# Patient Record
Sex: MALE | Race: WHITE | NOT HISPANIC OR LATINO | Employment: FULL TIME | URBAN - METROPOLITAN AREA
[De-identification: names, ages, dates, MRNs, and addresses within clinical notes are randomized per-mention and may not be internally consistent; named-entity substitution may affect disease eponyms.]

---

## 2017-01-03 ENCOUNTER — APPOINTMENT (OUTPATIENT)
Dept: PREADMISSION TESTING | Facility: HOSPITAL | Age: 62
End: 2017-01-03

## 2017-01-03 ENCOUNTER — HOSPITAL ENCOUNTER (OUTPATIENT)
Dept: GENERAL RADIOLOGY | Facility: HOSPITAL | Age: 62
Discharge: HOME | End: 2017-01-03
Admitting: ORTHOPAEDIC SURGERY

## 2017-01-03 VITALS — WEIGHT: 209.44 LBS | BODY MASS INDEX: 33.66 KG/M2 | HEIGHT: 66 IN

## 2017-01-03 DIAGNOSIS — Z01.89 LABORATORY TEST: Primary | ICD-10-CM

## 2017-01-03 LAB
ABO GROUP BLD: NORMAL
ALBUMIN SERPL-MCNC: 4.2 G/DL (ref 3.2–4.8)
ALBUMIN/GLOB SERPL: 1.8 G/DL (ref 1.5–2.5)
ALP SERPL-CCNC: 82 U/L (ref 25–100)
ALT SERPL W P-5'-P-CCNC: 20 U/L (ref 7–40)
ANION GAP SERPL CALCULATED.3IONS-SCNC: 1 MMOL/L (ref 3–11)
APTT PPP: 25.5 SECONDS (ref 24–31)
AST SERPL-CCNC: 18 U/L (ref 0–33)
BILIRUB SERPL-MCNC: 0.7 MG/DL (ref 0.3–1.2)
BUN BLD-MCNC: 13 MG/DL (ref 9–23)
BUN/CREAT SERPL: 14.4 (ref 7–25)
CALCIUM SPEC-SCNC: 9.2 MG/DL (ref 8.7–10.4)
CHLORIDE SERPL-SCNC: 107 MMOL/L (ref 99–109)
CO2 SERPL-SCNC: 32 MMOL/L (ref 20–31)
CREAT BLD-MCNC: 0.9 MG/DL (ref 0.6–1.3)
DEPRECATED RDW RBC AUTO: 49.1 FL (ref 37–54)
ERYTHROCYTE [DISTWIDTH] IN BLOOD BY AUTOMATED COUNT: 14.5 % (ref 11.3–14.5)
GFR SERPL CREATININE-BSD FRML MDRD: 86 ML/MIN/1.73
GLOBULIN UR ELPH-MCNC: 2.3 GM/DL
GLUCOSE BLD-MCNC: 120 MG/DL (ref 70–100)
HBA1C MFR BLD: 5.8 % (ref 4.8–5.6)
HCT VFR BLD AUTO: 45.3 % (ref 38.9–50.9)
HGB BLD-MCNC: 15.9 G/DL (ref 13.1–17.5)
INR PPP: 0.95
MCH RBC QN AUTO: 32.5 PG (ref 27–31)
MCHC RBC AUTO-ENTMCNC: 35.1 G/DL (ref 32–36)
MCV RBC AUTO: 92.6 FL (ref 80–99)
PLATELET # BLD AUTO: 143 10*3/MM3 (ref 150–450)
PMV BLD AUTO: 11.2 FL (ref 6–12)
POTASSIUM BLD-SCNC: 4.2 MMOL/L (ref 3.5–5.5)
PROT SERPL-MCNC: 6.5 G/DL (ref 5.7–8.2)
PROTHROMBIN TIME: 10.3 SECONDS (ref 9.6–11.5)
RBC # BLD AUTO: 4.89 10*6/MM3 (ref 4.2–5.76)
RH BLD: POSITIVE
SODIUM BLD-SCNC: 140 MMOL/L (ref 132–146)
WBC NRBC COR # BLD: 6.84 10*3/MM3 (ref 3.5–10.8)

## 2017-01-03 PROCEDURE — 85027 COMPLETE CBC AUTOMATED: CPT | Performed by: ORTHOPAEDIC SURGERY

## 2017-01-03 PROCEDURE — 83036 HEMOGLOBIN GLYCOSYLATED A1C: CPT | Performed by: ORTHOPAEDIC SURGERY

## 2017-01-03 PROCEDURE — 93005 ELECTROCARDIOGRAM TRACING: CPT

## 2017-01-03 PROCEDURE — 86901 BLOOD TYPING SEROLOGIC RH(D): CPT

## 2017-01-03 PROCEDURE — 85610 PROTHROMBIN TIME: CPT | Performed by: ORTHOPAEDIC SURGERY

## 2017-01-03 PROCEDURE — 71020 HC CHEST PA AND LATERAL: CPT

## 2017-01-03 PROCEDURE — 80053 COMPREHEN METABOLIC PANEL: CPT | Performed by: ORTHOPAEDIC SURGERY

## 2017-01-03 PROCEDURE — 86900 BLOOD TYPING SEROLOGIC ABO: CPT

## 2017-01-03 PROCEDURE — 85730 THROMBOPLASTIN TIME PARTIAL: CPT | Performed by: ORTHOPAEDIC SURGERY

## 2017-01-03 PROCEDURE — 36415 COLL VENOUS BLD VENIPUNCTURE: CPT

## 2017-01-03 RX ORDER — LISINOPRIL 10 MG/1
10 TABLET ORAL DAILY
COMMUNITY
Start: 2013-09-17

## 2017-01-03 NOTE — DISCHARGE INSTRUCTIONS
The following information and instructions were given:    NPO after MN except sips of water with routine prescribed medication (except blood thinner, diabetes, or weight reducing medication) unless otherwise instructed by your physician.  Do not eat, drink, smoke or chew gum after MN the night before surgery. This also includes no mints.    DO NOT shave, wear makeup or dark nail polish.    Remove all jewelry (advised to go to jeweler if unable to remove).    Leave anything you consider valuable at home.    Leave your suitcase in the car until after your surgery.    Bring the following with you (if applicable)   -picture ID and insurance cards   -Co-pay/deductible required by insurance   -Medications in the original bottles (not a list) including all over-the-counter  medications if not brought to PAT   -Copy of advance directive, living will or power of  documents if not  brought to PAT   -CPAP or BIPAP mask and tubing (do not bring machine)   -Skin prep instructions sheet   -PAT Pass   Education booklet, brochure, handout or binder given to patient.    Pain Control After Surgery handout given to patient.    Respirex use (handout given to patient) and pneumonia prevention.    Signs and Symptoms of infection.    DVT Prevention stressing the importance of ambulation.    Patient to apply Chlorhexadine wipes to surgical area (as instructed) the night before procedure and the AM of procedure.

## 2017-01-03 NOTE — PAT
BRIGETTE MEASUREMENTS   LENGTH 17IN   CALF 15.5IN    TYPE AND SCREEN TOO EARLY TO DRAW IN PAT, PINK TUBE DRAWN AND SENT TO LAB

## 2017-01-16 ENCOUNTER — ANESTHESIA EVENT (OUTPATIENT)
Dept: PERIOP | Facility: HOSPITAL | Age: 62
End: 2017-01-16

## 2017-01-16 ENCOUNTER — APPOINTMENT (OUTPATIENT)
Dept: GENERAL RADIOLOGY | Facility: HOSPITAL | Age: 62
End: 2017-01-16

## 2017-01-16 ENCOUNTER — ANESTHESIA (OUTPATIENT)
Dept: PERIOP | Facility: HOSPITAL | Age: 62
End: 2017-01-16

## 2017-01-16 PROBLEM — M19.019 SHOULDER ARTHRITIS: Status: ACTIVE | Noted: 2017-01-16

## 2017-01-16 PROBLEM — K21.9 GERD (GASTROESOPHAGEAL REFLUX DISEASE): Status: ACTIVE | Noted: 2017-01-16

## 2017-01-16 PROBLEM — Z96.612 STATUS POST TOTAL REPLACEMENT OF LEFT SHOULDER: Status: ACTIVE | Noted: 2017-01-16

## 2017-01-16 PROBLEM — I10 HTN (HYPERTENSION): Status: ACTIVE | Noted: 2017-01-16

## 2017-01-16 PROBLEM — F41.9 ANXIETY: Status: ACTIVE | Noted: 2017-01-16

## 2017-01-16 PROCEDURE — 25010000003 CEFAZOLIN IN DEXTROSE 2-4 GM/100ML-% SOLUTION: Performed by: ORTHOPAEDIC SURGERY

## 2017-01-16 PROCEDURE — 73020 X-RAY EXAM OF SHOULDER: CPT

## 2017-01-16 PROCEDURE — 25010000002 PROPOFOL 10 MG/ML EMULSION: Performed by: NURSE ANESTHETIST, CERTIFIED REGISTERED

## 2017-01-16 PROCEDURE — 25010000002 NEOSTIGMINE 10 MG/10ML SOLUTION: Performed by: NURSE ANESTHETIST, CERTIFIED REGISTERED

## 2017-01-16 PROCEDURE — 25010000002 HYDROMORPHONE PER 4 MG: Performed by: NURSE ANESTHETIST, CERTIFIED REGISTERED

## 2017-01-16 RX ORDER — HYDROMORPHONE HCL 110MG/55ML
PATIENT CONTROLLED ANALGESIA SYRINGE INTRAVENOUS AS NEEDED
Status: DISCONTINUED | OUTPATIENT
Start: 2017-01-16 | End: 2017-01-16 | Stop reason: SURG

## 2017-01-16 RX ORDER — ATRACURIUM BESYLATE 10 MG/ML
INJECTION, SOLUTION INTRAVENOUS AS NEEDED
Status: DISCONTINUED | OUTPATIENT
Start: 2017-01-16 | End: 2017-01-16 | Stop reason: SURG

## 2017-01-16 RX ORDER — PROPOFOL 10 MG/ML
VIAL (ML) INTRAVENOUS AS NEEDED
Status: DISCONTINUED | OUTPATIENT
Start: 2017-01-16 | End: 2017-01-16 | Stop reason: SURG

## 2017-01-16 RX ORDER — LIDOCAINE HYDROCHLORIDE 10 MG/ML
INJECTION, SOLUTION EPIDURAL; INFILTRATION; INTRACAUDAL; PERINEURAL AS NEEDED
Status: DISCONTINUED | OUTPATIENT
Start: 2017-01-16 | End: 2017-01-16 | Stop reason: SURG

## 2017-01-16 RX ORDER — NEOSTIGMINE METHYLSULFATE 1 MG/ML
INJECTION, SOLUTION INTRAVENOUS AS NEEDED
Status: DISCONTINUED | OUTPATIENT
Start: 2017-01-16 | End: 2017-01-16 | Stop reason: SURG

## 2017-01-16 RX ORDER — GLYCOPYRROLATE 0.2 MG/ML
INJECTION INTRAMUSCULAR; INTRAVENOUS AS NEEDED
Status: DISCONTINUED | OUTPATIENT
Start: 2017-01-16 | End: 2017-01-16 | Stop reason: SURG

## 2017-01-16 RX ORDER — BUPIVACAINE HYDROCHLORIDE 5 MG/ML
INJECTION, SOLUTION EPIDURAL; INTRACAUDAL AS NEEDED
Status: DISCONTINUED | OUTPATIENT
Start: 2017-01-16 | End: 2017-01-16 | Stop reason: SURG

## 2017-01-16 RX ORDER — BUPIVACAINE HYDROCHLORIDE 2.5 MG/ML
INJECTION, SOLUTION EPIDURAL; INFILTRATION; INTRACAUDAL AS NEEDED
Status: DISCONTINUED | OUTPATIENT
Start: 2017-01-16 | End: 2017-01-16 | Stop reason: SURG

## 2017-01-16 RX ADMIN — BUPIVACAINE HYDROCHLORIDE 15 ML: 2.5 INJECTION, SOLUTION EPIDURAL; INFILTRATION; INTRACAUDAL; PERINEURAL at 13:55

## 2017-01-16 RX ADMIN — CEFAZOLIN SODIUM 2 G: 2 INJECTION, SOLUTION INTRAVENOUS at 14:49

## 2017-01-16 RX ADMIN — LIDOCAINE HYDROCHLORIDE 50 MG: 10 INJECTION, SOLUTION EPIDURAL; INFILTRATION; INTRACAUDAL; PERINEURAL at 14:55

## 2017-01-16 RX ADMIN — HYDROMORPHONE HYDROCHLORIDE 0.5 MG: 2 INJECTION, SOLUTION INTRAMUSCULAR; INTRAVENOUS; SUBCUTANEOUS at 16:40

## 2017-01-16 RX ADMIN — PROPOFOL 150 MG: 10 INJECTION, EMULSION INTRAVENOUS at 14:55

## 2017-01-16 RX ADMIN — ROBINUL 0.4 MG: 0.2 INJECTION INTRAMUSCULAR; INTRAVENOUS at 16:35

## 2017-01-16 RX ADMIN — ATRACURIUM BESYLATE 50 MG: 10 INJECTION, SOLUTION INTRAVENOUS at 14:55

## 2017-01-16 RX ADMIN — NEOSTIGMINE METHYLSULFATE 2.5 MG: 1 INJECTION, SOLUTION INTRAVENOUS at 16:35

## 2017-01-16 RX ADMIN — BUPIVACAINE HYDROCHLORIDE 10 ML: 5 INJECTION, SOLUTION EPIDURAL; INTRACAUDAL; PERINEURAL at 16:41

## 2017-01-16 RX ADMIN — ROBINUL 0.2 MG: 0.2 INJECTION INTRAMUSCULAR; INTRAVENOUS at 16:26

## 2017-01-16 NOTE — ANESTHESIA PROCEDURE NOTES
Peripheral Block    Patient location during procedure: pre-op  Reason for block: at surgeon's request and post-op pain management  Performed by  Anesthesiologist: BETH DUQUE  CRNA: BAM SIFUENTES  Preanesthetic Checklist  Completed: patient identified, site marked, surgical consent, pre-op evaluation, timeout performed, IV checked, risks and benefits discussed and monitors and equipment checked  Peripheral Block Prep:  Sterile barriers:cap, gloves, mask and sterile barriers  Prep: ChloraPrep  Patient monitoring: blood pressure monitoring, continuous pulse oximetry and EKG  Peripheral Procedure  Sedation:yes  Guidance:ultrasound guided  Images:still images obtained  Laterality:leftBlock Type:interscalene  Injection Technique:catheterNeedle Type:Tuohy  Needle Gauge:18 G  Catheter Size:20 G (20g)  Medications  Local Injected:bupivacaine 0.25% without epinephrine Local Amount Injected:15mL  Post Assessment  Patient Tolerance:comfortable throughout block  Complications:no  Additional Notes  Procedure:                Catheter at skin-8                                      Anesthesia was provide by IV Sedation( see meds)      The pt was placed in semifowlers position with a slight tilt of the thorax contralateral to the insertion site.  The Insertion Site was prepped and draped in sterile fashion.  The skin was anesthetized with Lidocaine 1% 1ml injection utilizing a 25g needle.  Utilizing ultrasound guidance, a BBraun 2 inch 18 g Contiplex echogenic touhy needle was advanced in-plane.  Hydro dissection of tissue was achieved with Normal saline. Major vessels(carotid and Internal Jugular) where visualized as the brachial plexus was approached at the approximate level of C-7/ T-1.  Cervical 5 and Branches of Cervical 6 nerve roots where visualized and the needle tip was placed posterior at the level of C-6 roots.  LA spread was visualized and injection was made incrementally every 5 mls with aspiration. Injection  pressure was normal or little, there was no intraneural injection, no vascular injection.      The BBraun 20 g wire stylet  catheter was then placed under US guidance on the posterior aspect of the Brachial Plexus.  Location of catheter was confirmed with NS injection visualized with US . The tuohy was then removed and the skin was sealed with Skin AFix at catheter insertion site.  Skin was prepped with mastisol and the labeled catheter  was secured with steristrips and a CHG tegaderm. Thank You.

## 2017-01-16 NOTE — ANESTHESIA PROCEDURE NOTES
Airway  Urgency: elective    Airway not difficult    General Information and Staff    Patient location during procedure: OR    Indications and Patient Condition  Indications for airway management: airway protection    Preoxygenated: yes  MILS not maintained throughout  Mask difficulty assessment: 1 - vent by mask    Final Airway Details  Final airway type: endotracheal airway      Successful airway: ETT  Cuffed: yes   Successful intubation technique: direct laryngoscopy  Endotracheal tube insertion site: oral  Blade: Rivera  Blade size: #2  ETT size: 7.5 mm  Cormack-Lehane Classification: grade I - full view of glottis  Placement verified by: chest auscultation and capnometry   Measured from: lips  ETT to lips (cm): 20  Number of attempts at approach: 1    Additional Comments  Negative epigastric sounds, Breath sound equal bilaterally with symmetric chest rise and fall

## 2017-01-16 NOTE — ANESTHESIA POSTPROCEDURE EVALUATION
Patient: Delvin Meng    Procedure Summary     Date Anesthesia Start Anesthesia Stop Room / Location    01/16/17 1449 1652 [facility] / [facility]       Procedure Diagnosis Surgeon Provider    LEFT TOTAL SHOULDER ARTHROPLASTY (Left Shoulder) No diagnosis on file. MD Pee Nunez MD          Anesthesia Type: general  Last vitals  /76 (01/16/17 1648)    Temp 98 °F (36.7 °C) (01/16/17 1648)    Pulse 64 (01/16/17 1648)   Resp 16 (01/16/17 1648)    SpO2 98 % (01/16/17 1648)      Post Anesthesia Care and Evaluation    Patient location during evaluation: PACU  Patient participation: complete - patient participated  Level of consciousness: awake and alert  Pain score: 3  Pain management: adequate  Airway patency: patent  Anesthetic complications: No anesthetic complications  PONV Status: none  Cardiovascular status: hemodynamically stable and acceptable  Respiratory status: nonlabored ventilation, acceptable and nasal cannula  Hydration status: acceptable    Comments:

## 2017-01-16 NOTE — ANESTHESIA PREPROCEDURE EVALUATION
Anesthesia Evaluation     Patient summary reviewed and Nursing notes reviewed    History of anesthetic complications (ponv)   Airway   Mallampati: II  TM distance: >3 FB  Neck ROM: full  no difficulty expected  Dental      Pulmonary - normal exam   (+) hx of smoking,   Cardiovascular - normal exam  (+) hypertension,     Neuro/Psych  (+) psychiatric history Anxiety,    GI/Hepatic/Renal/Endo    (+)  hiatal hernia, GERD,     Musculoskeletal     Abdominal    Substance History      OB/GYN          Other   (+) arthritis                        Anesthesia Plan    ASA 2     general and regional   (Interscalene)  intravenous induction   Anesthetic plan and risks discussed with patient.    Plan discussed with CRNA.

## 2017-02-06 ENCOUNTER — TREATMENT (OUTPATIENT)
Dept: PHYSICAL THERAPY | Facility: CLINIC | Age: 62
End: 2017-02-06

## 2017-02-06 ENCOUNTER — TRANSCRIBE ORDERS (OUTPATIENT)
Dept: PHYSICAL THERAPY | Facility: CLINIC | Age: 62
End: 2017-02-06

## 2017-02-06 DIAGNOSIS — Z96.612 STATUS POST TOTAL SHOULDER REPLACEMENT, LEFT: ICD-10-CM

## 2017-02-06 DIAGNOSIS — M25.512 ACUTE PAIN OF LEFT SHOULDER: Primary | ICD-10-CM

## 2017-02-06 DIAGNOSIS — Z48.89 ENCOUNTER FOR OTHER SPECIFIED SURGICAL AFTERCARE: Primary | ICD-10-CM

## 2017-02-06 DIAGNOSIS — Z47.89 ORTHOPEDIC AFTERCARE: ICD-10-CM

## 2017-02-06 DIAGNOSIS — M25.512 LEFT SHOULDER PAIN, UNSPECIFIED CHRONICITY: ICD-10-CM

## 2017-02-06 PROCEDURE — 97161 PT EVAL LOW COMPLEX 20 MIN: CPT | Performed by: PHYSICAL THERAPIST

## 2017-02-06 PROCEDURE — 97110 THERAPEUTIC EXERCISES: CPT | Performed by: PHYSICAL THERAPIST

## 2017-02-06 PROCEDURE — 97140 MANUAL THERAPY 1/> REGIONS: CPT | Performed by: PHYSICAL THERAPIST

## 2017-02-06 NOTE — PROGRESS NOTES
Physical Therapy Initial Evaluation and Plan of Care        Subjective Evaluation    History of Present Illness  Date of surgery: 2017  Mechanism of injury: Dislocated shoulder 40 years ago. Didn't seek medical attention. Pain over the years.   Finally had an MRI, and damage was seen. MD recommended shoulder replacement. Put it off for some time and then decided to proceed with operation  Right handed. Last day in sling was Friday.  TSR 17      Pain  Current pain ratin  At best pain ratin  At worst pain ratin  Location: anterior shoulder, incision  Exacerbated by: increased usage, reaching overhead, getting dressed.    Patient Goals  Patient goals for therapy: decreased pain, increased strength and independence with ADLs/IADLs             Objective     Observations   Left Shoulder   Positive for adhesive scar.     Tenderness     Additional Tenderness Details  Incision-mild tenderness    Neurological Testing   Sensation     Shoulder   Left Shoulder   Intact: light touch    Right Shoulder   Intact: light touch    Active Range of Motion   Left Shoulder   Flexion: 60 degrees   Extension: 38 degrees   Abduction: 38 degrees   External rotation 0°: 10 degrees   Internal rotation 0°: 25 degrees     Right Shoulder   Flexion: 175 degrees   Abduction: 175 degrees   External rotation 90°: 84 degrees  Internal rotation 90°: 45 degrees     Additional Active Range of Motion Details  Elbow, wrist, forearm ROM all WNL.     Passive Range of Motion   Left Shoulder   Flexion: 95 degrees   Abduction: 78 degrees   External rotation 0°: 10 degrees   Internal rotation 0°: 45 degrees          Assessment & Plan     Assessment  Impairments: abnormal muscle firing, abnormal or restricted ROM, activity intolerance, impaired physical strength, lacks appropriate home exercise program and pain with function  Assessment details: Pt presents to therapy s/p TSA on 17. He has recently discharged his sling. Significant ROM  deficits present left shoulder. Pain anterior shoulder. Tenderness localized to incision. Pt limited with all ADLs secondary to ROM and strength deficits. Pt would benefit from therapy.   Prognosis: good  Prognosis details: STG 2 wks  1. Pt to be independent with HEP.  2. Pt to rate left shoulder pain at worst less than or equal to 2/10 for improved sleep quality.     LTG 4 wks  1. Pt to have improved left shoulder ROM to WFL for improved function getting dressed.  2. Pt to have improved left shoulder strength to at least 4/5 for return to previous activity level.   3. Pt to rate overall improvement with therapy at least 60% for return to previous activity level.       Plan  Therapy options: will be seen for skilled physical therapy services  Planned modality interventions: cryotherapy, TENS and thermotherapy (hydrocollator packs)  Planned therapy interventions: functional ROM exercises, home exercise program, joint mobilization, manual therapy, soft tissue mobilization, strengthening and therapeutic activities  Frequency: 2x week  Duration in weeks: 4  Treatment plan discussed with: patient        Manual Therapy:    8     mins  20806;  Therapeutic Exercise:    8     mins  08134;     Neuromuscular Shannan:        mins  34444;    Therapeutic Activity:          mins  23034;     Gait Training:           mins  08408;     Ultrasound:          mins  77091;    Electrical Stimulation:         mins  27650 ( );  Dry Needling          mins self-pay    Timed Treatment:   16   mins   Total Treatment:     48   mins    PT SIGNATURE: Kt Mcgrath PT, DPT   DATE TREATMENT INITIATED: 2/6/2017    Initial Certification  Certification Period: 5/7/2017  I certify that the therapy services are furnished while this patient is under my care.  The services outlined above are required by this patient, and will be reviewed every 90 days.     PHYSICIAN: Zafar Colorado MD      DATE:     Please sign and return via fax to  [phone].. Thank  you, [facility].

## 2017-02-09 ENCOUNTER — TREATMENT (OUTPATIENT)
Dept: PHYSICAL THERAPY | Facility: CLINIC | Age: 62
End: 2017-02-09

## 2017-02-09 DIAGNOSIS — M25.512 ACUTE PAIN OF LEFT SHOULDER: Primary | ICD-10-CM

## 2017-02-09 DIAGNOSIS — Z96.612 STATUS POST TOTAL SHOULDER REPLACEMENT, LEFT: ICD-10-CM

## 2017-02-09 PROCEDURE — 97110 THERAPEUTIC EXERCISES: CPT | Performed by: PHYSICAL THERAPIST

## 2017-02-09 PROCEDURE — 97140 MANUAL THERAPY 1/> REGIONS: CPT | Performed by: PHYSICAL THERAPIST

## 2017-02-09 PROCEDURE — 97530 THERAPEUTIC ACTIVITIES: CPT | Performed by: PHYSICAL THERAPIST

## 2017-02-09 NOTE — PROGRESS NOTES
Physical Therapy Daily Progress Note    Subjective Pt reports he has been doing HEP. Shoulder has been feeling okay. Feels it may be moving a little better    Objective   See Exercise, Manual, and Modality Logs for complete treatment.   Significant limitation with shoulder ROM. Capsular pattern.       Assessment/Plan Reviewed HEP. Pt has a good understanding. Improved ROM with wand exercises today. Pt needs reminders not to over do it.    Progress per Plan of Care           Manual Therapy:    12     mins  01257;  Therapeutic Exercise:    15     mins  72014;     Neuromuscular Shannan:        mins  22855;    Therapeutic Activity:     12     mins  54922;     Gait Training:           mins  89028;     Ultrasound:          mins  47449;    Electrical Stimulation:         mins  40423 ( );  Dry Needling          mins self-pay    Timed Treatment:   39   mins   Total Treatment:     52   mins    Kt Mcgrath PT  Physical Therapist

## 2017-02-13 ENCOUNTER — TREATMENT (OUTPATIENT)
Dept: PHYSICAL THERAPY | Facility: CLINIC | Age: 62
End: 2017-02-13

## 2017-02-13 DIAGNOSIS — M25.512 ACUTE PAIN OF LEFT SHOULDER: Primary | ICD-10-CM

## 2017-02-13 DIAGNOSIS — Z96.612 STATUS POST TOTAL SHOULDER REPLACEMENT, LEFT: ICD-10-CM

## 2017-02-13 PROCEDURE — 97110 THERAPEUTIC EXERCISES: CPT | Performed by: PHYSICAL THERAPIST

## 2017-02-13 PROCEDURE — 97530 THERAPEUTIC ACTIVITIES: CPT | Performed by: PHYSICAL THERAPIST

## 2017-02-13 PROCEDURE — 97140 MANUAL THERAPY 1/> REGIONS: CPT | Performed by: PHYSICAL THERAPIST

## 2017-02-13 NOTE — PROGRESS NOTES
Physical Therapy Daily Progress Note      Subjective Pt reports a little soreness following therapy last week. Has decided to self wean from sling at night and has been doing okay with that. Has noticed improved function putting on coat.     Objective   See Exercise, Manual, and Modality Logs for complete treatment.   ROM progressing. Still significant limitation with ER.       Assessment/Plan Steri strips removed today. Instructed on scar massage for home. Emphasized the importance of not pushing too far with exercises. Pt has a tendency to over do and cause soreness/pain.     Progress per Plan of Care           Manual Therapy:    12     mins  83221;  Therapeutic Exercise:    17     mins  93570;     Neuromuscular Shannan:        mins  04164;    Therapeutic Activity:     10     mins  59180;     Gait Training:           mins  65763;     Ultrasound:          mins  81984;    Electrical Stimulation:         mins  98060 ( );  Dry Needling          mins self-pay    Timed Treatment:   39   mins   Total Treatment:     52   mins    Kt Mcgrath PT  Physical Therapist

## 2017-02-16 ENCOUNTER — TREATMENT (OUTPATIENT)
Dept: PHYSICAL THERAPY | Facility: CLINIC | Age: 62
End: 2017-02-16

## 2017-02-16 DIAGNOSIS — M25.512 ACUTE PAIN OF LEFT SHOULDER: Primary | ICD-10-CM

## 2017-02-16 DIAGNOSIS — Z96.612 STATUS POST TOTAL SHOULDER REPLACEMENT, LEFT: ICD-10-CM

## 2017-02-16 PROCEDURE — 97530 THERAPEUTIC ACTIVITIES: CPT | Performed by: PHYSICAL THERAPIST

## 2017-02-16 PROCEDURE — 97110 THERAPEUTIC EXERCISES: CPT | Performed by: PHYSICAL THERAPIST

## 2017-02-16 PROCEDURE — 97140 MANUAL THERAPY 1/> REGIONS: CPT | Performed by: PHYSICAL THERAPIST

## 2017-02-16 NOTE — PROGRESS NOTES
Physical Therapy Daily Progress Note        Subjective Pt reports shoulder feels better. Less pain. No problems sleeping without sling. Current pain 0/10 at rest. Was able to hold coffee cup and take a drink with left hand    Objective   See Exercise, Manual, and Modality Logs for complete treatment.       Assessment/Plan ROM progressing. More ease with exercises. Still sig limitation however, requires additional therapy.     Progress per Plan of Care           Manual Therapy:    12     mins  36071;  Therapeutic Exercise:    18    mins  19630;     Neuromuscular Shannan:        mins  53667;    Therapeutic Activity:     10     mins  92971;     Gait Training:           mins  92275;     Ultrasound:          mins  01154;    Electrical Stimulation:         mins  61150 ( );  Dry Needling          mins self-pay    Timed Treatment:   40   mins   Total Treatment:     53   mins    Kt Mcgrath, PT, DPT  Physical Therapist

## 2017-02-20 ENCOUNTER — TREATMENT (OUTPATIENT)
Dept: PHYSICAL THERAPY | Facility: CLINIC | Age: 62
End: 2017-02-20

## 2017-02-20 DIAGNOSIS — Z96.612 STATUS POST TOTAL SHOULDER REPLACEMENT, LEFT: ICD-10-CM

## 2017-02-20 DIAGNOSIS — M25.512 ACUTE PAIN OF LEFT SHOULDER: Primary | ICD-10-CM

## 2017-02-20 PROCEDURE — 97530 THERAPEUTIC ACTIVITIES: CPT | Performed by: PHYSICAL THERAPIST

## 2017-02-20 PROCEDURE — 97140 MANUAL THERAPY 1/> REGIONS: CPT | Performed by: PHYSICAL THERAPIST

## 2017-02-20 PROCEDURE — 97110 THERAPEUTIC EXERCISES: CPT | Performed by: PHYSICAL THERAPIST

## 2017-02-20 NOTE — PROGRESS NOTES
Physical Therapy Daily Progress Note      Subjective Pt reports shoulder continues to get better and ROM is improving. Had some pain trying to fall asleep last night.     Objective   See Exercise, Manual, and Modality Logs for complete treatment.       Assessment/Plan ROM continues to improve. Talked with pt about managing pain. And repeatedly have to remind him not to over do it with exercises.     Progress per Plan of Care           Manual Therapy:    12     mins  38020;  Therapeutic Exercise:    18     mins  33622;     Neuromuscular Shannan:       mins  36369;    Therapeutic Activity:     8     mins  57318;     Gait Training:           mins  24323;     Ultrasound:          mins  40014;    Electrical Stimulation:         mins  47674 ( );  Dry Needling          mins self-pay    Timed Treatment:   38   mins   Total Treatment:     52   mins    Kt Mcgrath, PT, DPT  Physical Therapist

## 2017-02-24 ENCOUNTER — TREATMENT (OUTPATIENT)
Dept: PHYSICAL THERAPY | Facility: CLINIC | Age: 62
End: 2017-02-24

## 2017-02-24 DIAGNOSIS — M25.512 ACUTE PAIN OF LEFT SHOULDER: Primary | ICD-10-CM

## 2017-02-24 DIAGNOSIS — Z96.612 STATUS POST TOTAL SHOULDER REPLACEMENT, LEFT: ICD-10-CM

## 2017-02-24 PROCEDURE — 97530 THERAPEUTIC ACTIVITIES: CPT | Performed by: PHYSICAL THERAPIST

## 2017-02-24 PROCEDURE — 97110 THERAPEUTIC EXERCISES: CPT | Performed by: PHYSICAL THERAPIST

## 2017-02-24 PROCEDURE — 97140 MANUAL THERAPY 1/> REGIONS: CPT | Performed by: PHYSICAL THERAPIST

## 2017-02-24 NOTE — PROGRESS NOTES
"Physical Therapy Daily Progress Note      Subjective Pt reports shoulder has been feeling good. Says he has been \"doing what he was told at home.\" Can tell he moved it too much on Tuesday. When asked what he was doing he said talking with his hands or drinking coffee. He says he learned his lesson    Objective   See Exercise, Manual, and Modality Logs for complete treatment.       Assessment/Plan shoulder ROM continues to improve. Added ball rolls on table today. Pt did well.     Progress per Plan of Care           Manual Therapy:    10     mins  16177;  Therapeutic Exercise:    18     mins  17134;     Neuromuscular Shannan:        mins  03243;    Therapeutic Activity:     12     mins  19874;     Gait Training:           mins  82176;     Ultrasound:          mins  87044;    Electrical Stimulation:         mins  27392 ( );  Dry Needling          mins self-pay    Timed Treatment:   40   mins   Total Treatment:     53   mins    Kt Mcgrath, PT, DPT  Physical Therapist  "

## 2017-02-27 ENCOUNTER — TREATMENT (OUTPATIENT)
Dept: PHYSICAL THERAPY | Facility: CLINIC | Age: 62
End: 2017-02-27

## 2017-02-27 DIAGNOSIS — M25.512 ACUTE PAIN OF LEFT SHOULDER: Primary | ICD-10-CM

## 2017-02-27 DIAGNOSIS — Z96.612 STATUS POST TOTAL SHOULDER REPLACEMENT, LEFT: ICD-10-CM

## 2017-02-27 PROCEDURE — 97530 THERAPEUTIC ACTIVITIES: CPT | Performed by: PHYSICAL THERAPIST

## 2017-02-27 PROCEDURE — 97140 MANUAL THERAPY 1/> REGIONS: CPT | Performed by: PHYSICAL THERAPIST

## 2017-02-27 PROCEDURE — 97110 THERAPEUTIC EXERCISES: CPT | Performed by: PHYSICAL THERAPIST

## 2017-02-27 NOTE — PROGRESS NOTES
Physical Therapy Daily Progress Note      Subjective Pt reports shoulder has been feeling good other than with sleeping. Still painful at night requiring him to take pain meds. Tried taking 1/2 a pill and it wasn't enough. Has been able to only take them once a day however.     Objective   See Exercise, Manual, and Modality Logs for complete treatment. Still sig limitation with ER ROM      Assessment/Plan ROM progressing. Increased number of reps with wand exercises today.     Progress per Plan of Care           Manual Therapy:    13     mins  43037;  Therapeutic Exercise:    17     mins  07298;     Neuromuscular Shannan:        mins  64981;    Therapeutic Activity:     15     mins  90040;     Gait Training:           mins  04299;     Ultrasound:          mins  49491;    Electrical Stimulation:         mins  11227 ( );  Dry Needling          mins self-pay    Timed Treatment:   45   mins   Total Treatment:     58   mins    Kt Mcgrath, PT, DPT  Physical Therapist

## 2017-03-02 ENCOUNTER — TREATMENT (OUTPATIENT)
Dept: PHYSICAL THERAPY | Facility: CLINIC | Age: 62
End: 2017-03-02

## 2017-03-02 DIAGNOSIS — M25.512 ACUTE PAIN OF LEFT SHOULDER: Primary | ICD-10-CM

## 2017-03-02 DIAGNOSIS — Z96.612 STATUS POST TOTAL SHOULDER REPLACEMENT, LEFT: ICD-10-CM

## 2017-03-02 PROCEDURE — 97140 MANUAL THERAPY 1/> REGIONS: CPT | Performed by: PHYSICAL THERAPIST

## 2017-03-02 PROCEDURE — 97110 THERAPEUTIC EXERCISES: CPT | Performed by: PHYSICAL THERAPIST

## 2017-03-02 NOTE — PROGRESS NOTES
Physical Therapy Daily Progress Note/MD note        Subjective Pt reports still some pain at night. Has been better taking pain pill earlier to allow time to work before going to bed. Feels he has gotten a better technique with wand exercises as well.   Pt reports overall improvement with therapy. ROM is improving. Rates pain at worst 7/10 on occasion. Only lasts a second. Pain normally 3/10.    Objective   See Exercise, Manual, and Modality Logs for complete treatment.     Left shoulder PROM: flexion 130, abduction 105, ER at neutral 35, IR 50    No significant tenderness        Assessment/Plan Shoulder ROM progressing. Had to initially continually remind pt not to over do it with exercises. He had a tendency to push himself too far at times.  Since then, he seems to have realized this and has improved. Pt anxious to regain function and has been complaint with exercises at home. Feel he will continue to progress with therapy.     Progress per Plan of Care   Please feel free to contact me with any questions or concerns. Thank you for this referral.            Manual Therapy:    12     mins  72013;  Therapeutic Exercise:    27     mins  09714;     Neuromuscular Shannan:        mins  81843;    Therapeutic Activity:          mins  39803;     Gait Training:           mins  23464;     Ultrasound:          mins  27388;    Electrical Stimulation:         mins  33722 ( );  Dry Needling          mins self-pay    Timed Treatment:   39   mins   Total Treatment:     54   mins    Kt Mcgrath PT, DPT  Physical Therapist

## 2017-03-06 ENCOUNTER — TREATMENT (OUTPATIENT)
Dept: PHYSICAL THERAPY | Facility: CLINIC | Age: 62
End: 2017-03-06

## 2017-03-06 DIAGNOSIS — M25.512 ACUTE PAIN OF LEFT SHOULDER: Primary | ICD-10-CM

## 2017-03-06 DIAGNOSIS — Z96.612 STATUS POST TOTAL SHOULDER REPLACEMENT, LEFT: ICD-10-CM

## 2017-03-06 PROCEDURE — 97140 MANUAL THERAPY 1/> REGIONS: CPT | Performed by: PHYSICAL THERAPIST

## 2017-03-06 PROCEDURE — 97110 THERAPEUTIC EXERCISES: CPT | Performed by: PHYSICAL THERAPIST

## 2017-03-06 PROCEDURE — 97530 THERAPEUTIC ACTIVITIES: CPT | Performed by: PHYSICAL THERAPIST

## 2017-03-06 NOTE — PROGRESS NOTES
Physical Therapy Daily Progress Note      Subjective Pt reports his shoulder has been feeling good. Saw MD and was pleased with his progress. He said he could begin doing some active movements.     Objective   See Exercise, Manual, and Modality Logs for complete treatment.       Assessment/PlanPt progressed with exercises today. Added some AAROM. To see how he responds. Improved shoulder ER today. Evident that pt has been doing HEP    Progress per Plan of Care           Manual Therapy:    12     mins  97829;  Therapeutic Exercise:    19    mins  05192;     Neuromuscular Shannan:        mins  95702;    Therapeutic Activity:     12     mins  23416;     Gait Training:           mins  40716;     Ultrasound:          mins  10696;    Electrical Stimulation:         mins  09856 ( );  Dry Needling          mins self-pay    Timed Treatment:   43   mins   Total Treatment:     57   mins    Kt Mcgrath, PT, DPT  Physical Therapist

## 2017-03-09 ENCOUNTER — TREATMENT (OUTPATIENT)
Dept: PHYSICAL THERAPY | Facility: CLINIC | Age: 62
End: 2017-03-09

## 2017-03-09 DIAGNOSIS — Z96.612 STATUS POST TOTAL SHOULDER REPLACEMENT, LEFT: ICD-10-CM

## 2017-03-09 DIAGNOSIS — M25.512 ACUTE PAIN OF LEFT SHOULDER: Primary | ICD-10-CM

## 2017-03-09 PROCEDURE — 97110 THERAPEUTIC EXERCISES: CPT | Performed by: PHYSICAL THERAPIST

## 2017-03-09 PROCEDURE — 97140 MANUAL THERAPY 1/> REGIONS: CPT | Performed by: PHYSICAL THERAPIST

## 2017-03-09 PROCEDURE — 97530 THERAPEUTIC ACTIVITIES: CPT | Performed by: PHYSICAL THERAPIST

## 2017-03-09 NOTE — PROGRESS NOTES
Physical Therapy Daily Progress Note      Subjective Pt reports shoulder continues to improve. Has been doing table slides at home, but wants to make sure he is doing them correctly.     Objective   See Exercise, Manual, and Modality Logs for complete treatment.       Assessment/Plan Still significant limitation with ER ROM. Pt progressing with therapy however and has been compliant with HEP. Reviewed table slides. Pt has a good understanding of these.     Progress per Plan of Care           Manual Therapy:    15     mins  64548;  Therapeutic Exercise:    18     mins  61613;     Neuromuscular Shannan:        mins  11801;    Therapeutic Activity:     14     mins  48148;     Gait Training:           mins  30663;     Ultrasound:          mins  17276;    Electrical Stimulation:         mins  48301 ( );  Dry Needling          mins self-pay    Timed Treatment:   47   mins   Total Treatment:     60   mins    Kt Mcgrath, PT, DPT  Physical Therapist

## 2017-03-14 ENCOUNTER — TREATMENT (OUTPATIENT)
Dept: PHYSICAL THERAPY | Facility: CLINIC | Age: 62
End: 2017-03-14

## 2017-03-14 DIAGNOSIS — Z96.612 STATUS POST TOTAL SHOULDER REPLACEMENT, LEFT: Primary | ICD-10-CM

## 2017-03-14 PROCEDURE — 97110 THERAPEUTIC EXERCISES: CPT | Performed by: PHYSICAL THERAPIST

## 2017-03-14 PROCEDURE — 97140 MANUAL THERAPY 1/> REGIONS: CPT | Performed by: PHYSICAL THERAPIST

## 2017-03-14 PROCEDURE — 97530 THERAPEUTIC ACTIVITIES: CPT | Performed by: PHYSICAL THERAPIST

## 2017-03-14 NOTE — PROGRESS NOTES
Physical Therapy Daily Progress Note        Delvin Meng reports 2/10 pain today at rest.  Mornings are worse than any other time.         Objective Pt present to PT today with no distress at rest.     PROM L Shoulder Abd 85 degrees     A/AROM supine shoulder flexion 140 degrees      See Exercise, Manual, and Modality Logs for complete treatment.     Assessment/Plan  Pt with good increase in ROM.  Strength slowly improving.        Progress per Plan of Care  Check response to elevated there-x           Manual Therapy:    13     mins  36948;  Therapeutic Exercise:    34     mins  62301;     Neuromuscular Shannan:        mins  69897;    Therapeutic Activity:     10     mins  00398;     Gait Training:        ___  mins  89934;     Ultrasound:          mins  25941;    Electrical Stimulation:         mins  17479 ( );  Dry Needling          mins self-pay    Timed Treatment:   57   mins   Total Treatment:     69   mins    Gal Everett, PT  Physical Therapist

## 2017-03-20 ENCOUNTER — TREATMENT (OUTPATIENT)
Dept: PHYSICAL THERAPY | Facility: CLINIC | Age: 62
End: 2017-03-20

## 2017-03-20 DIAGNOSIS — Z96.612 STATUS POST TOTAL SHOULDER REPLACEMENT, LEFT: Primary | ICD-10-CM

## 2017-03-20 DIAGNOSIS — M25.512 ACUTE PAIN OF LEFT SHOULDER: ICD-10-CM

## 2017-03-20 PROCEDURE — 97140 MANUAL THERAPY 1/> REGIONS: CPT | Performed by: PHYSICAL THERAPIST

## 2017-03-20 PROCEDURE — 97110 THERAPEUTIC EXERCISES: CPT | Performed by: PHYSICAL THERAPIST

## 2017-03-20 NOTE — PROGRESS NOTES
Physical Therapy Daily Progress Note      Subjective Pt reports his shoulder continues to feel better. Was able to drive manual car this weekend without increased pain. Also able to drive with hand higher up on steering wheel vs down at the bottom.     Objective   See Exercise, Manual, and Modality Logs for complete treatment.       Assessment/Plan Shoulder ROM progressing. Still needs some occasional cueing for proper technique.     Progress per Plan of Care           Manual Therapy:    15     mins  99895;  Therapeutic Exercise:    27  mins  12156;     Neuromuscular Shannan:        mins  63025;    Therapeutic Activity:          mins  12687;     Gait Training:           mins  78798;     Ultrasound:          mins  99893;    Electrical Stimulation:         mins  24795 ( );  Dry Needling          mins self-pay    Timed Treatment:   42   mins   Total Treatment:     58   mins    Kt Mcgrath, PT, DPT  Physical Therapist

## 2017-03-23 ENCOUNTER — TREATMENT (OUTPATIENT)
Dept: PHYSICAL THERAPY | Facility: CLINIC | Age: 62
End: 2017-03-23

## 2017-03-23 DIAGNOSIS — M25.512 ACUTE PAIN OF LEFT SHOULDER: ICD-10-CM

## 2017-03-23 DIAGNOSIS — Z96.612 STATUS POST TOTAL SHOULDER REPLACEMENT, LEFT: Primary | ICD-10-CM

## 2017-03-23 PROCEDURE — 97140 MANUAL THERAPY 1/> REGIONS: CPT | Performed by: PHYSICAL THERAPIST

## 2017-03-23 PROCEDURE — 97530 THERAPEUTIC ACTIVITIES: CPT | Performed by: PHYSICAL THERAPIST

## 2017-03-23 PROCEDURE — 97110 THERAPEUTIC EXERCISES: CPT | Performed by: PHYSICAL THERAPIST

## 2017-03-23 NOTE — PROGRESS NOTES
"Physical Therapy Daily Progress Note      Subjective Pt reports he felt a little \"beat up\" after therapy. Didn't do any new exercises last visit but says maybe he pushed it too far. Felt tired, no significant increased pain.     Objective   See Exercise, Manual, and Modality Logs for complete treatment.       Assessment/Plan Cautioned pt again about overdoing exercises. Did better today. Realizes he may have gone too far last visit.     Progress per Plan of Care           Manual Therapy:    15     mins  45407;  Therapeutic Exercise:    15     mins  05222;     Neuromuscular Shannan:        mins  52712;    Therapeutic Activity:     12     mins  49160;     Gait Training:           mins  08720;     Ultrasound:          mins  90602;    Electrical Stimulation:         mins  62438 ( );  Dry Needling          mins self-pay    Timed Treatment:   42   mins   Total Treatment:     55   mins    Kt Mcgrath, PT, DPT  Physical Therapist  "

## 2017-03-27 ENCOUNTER — TREATMENT (OUTPATIENT)
Dept: PHYSICAL THERAPY | Facility: CLINIC | Age: 62
End: 2017-03-27

## 2017-03-27 DIAGNOSIS — Z96.612 STATUS POST TOTAL SHOULDER REPLACEMENT, LEFT: Primary | ICD-10-CM

## 2017-03-27 DIAGNOSIS — M25.512 ACUTE PAIN OF LEFT SHOULDER: ICD-10-CM

## 2017-03-27 PROCEDURE — 97530 THERAPEUTIC ACTIVITIES: CPT | Performed by: PHYSICAL THERAPIST

## 2017-03-27 PROCEDURE — 97110 THERAPEUTIC EXERCISES: CPT | Performed by: PHYSICAL THERAPIST

## 2017-03-27 PROCEDURE — 97140 MANUAL THERAPY 1/> REGIONS: CPT | Performed by: PHYSICAL THERAPIST

## 2017-03-27 NOTE — PROGRESS NOTES
Physical Therapy Daily Progress Note        Subjective Pt reports he continues to see improvement with his shoulder. Is driving more normally now. Has been doing better with wand exercises at home.     Objective   See Exercise, Manual, and Modality Logs for complete treatment.       Assessment/Plan ROM continues to improve. Added isometric exercises today for stabilization and strengthening. Told pt he could progress to wall slides vs table for shoulder flexion at home. Pt did well with new exercises. Talked with him again about sleep position as he reports still difficulty getting comfortable.     Progress per Plan of Care           Manual Therapy:    15     mins  06408;  Therapeutic Exercise:    18     mins  72686;     Neuromuscular Shannan:        mins  67557;    Therapeutic Activity:     12     mins  81577;     Gait Training:           mins  13420;     Ultrasound:          mins  14130;    Electrical Stimulation:         mins  07104 ( );  Dry Needling          mins self-pay    Timed Treatment:   45   mins   Total Treatment:     58   mins    Kt Mcgrath, PT, DPT  Physical Therapist

## 2017-03-30 ENCOUNTER — TREATMENT (OUTPATIENT)
Dept: PHYSICAL THERAPY | Facility: CLINIC | Age: 62
End: 2017-03-30

## 2017-03-30 DIAGNOSIS — M25.512 ACUTE PAIN OF LEFT SHOULDER: ICD-10-CM

## 2017-03-30 DIAGNOSIS — Z96.612 STATUS POST TOTAL SHOULDER REPLACEMENT, LEFT: Primary | ICD-10-CM

## 2017-03-30 PROCEDURE — 97140 MANUAL THERAPY 1/> REGIONS: CPT | Performed by: PHYSICAL THERAPIST

## 2017-03-30 PROCEDURE — 97530 THERAPEUTIC ACTIVITIES: CPT | Performed by: PHYSICAL THERAPIST

## 2017-03-30 PROCEDURE — 97110 THERAPEUTIC EXERCISES: CPT | Performed by: PHYSICAL THERAPIST

## 2017-03-30 NOTE — PROGRESS NOTES
"Physical Therapy Daily Progress Note      Subjective Pt reports shoulder has been feeling more \"normal\". Has been able to raise it some over his head.     Objective   See Exercise, Manual, and Modality Logs for complete treatment.       Assessment/Plan Improved shoulder ROM today. Able to actively flex shoulder with less difficulty. Continues to improved, but requires additional therapy to meet goals outlined in the plan of care.     Progress per Plan of Care           Manual Therapy:    15     mins  43562;  Therapeutic Exercise:    18     mins  96430;     Neuromuscular Shannan:        mins  83326;    Therapeutic Activity:     14     mins  21840;     Gait Training:           mins  83217;     Ultrasound:          mins  17633;    Electrical Stimulation:         mins  93495 ( );  Dry Needling          mins self-pay    Timed Treatment:   47   mins   Total Treatment:     60   mins    Kt Mcgrath, PT, DPT  Physical Therapist  "

## 2017-04-03 ENCOUNTER — TREATMENT (OUTPATIENT)
Dept: PHYSICAL THERAPY | Facility: CLINIC | Age: 62
End: 2017-04-03

## 2017-04-03 DIAGNOSIS — Z96.612 STATUS POST TOTAL SHOULDER REPLACEMENT, LEFT: Primary | ICD-10-CM

## 2017-04-03 DIAGNOSIS — M25.512 ACUTE PAIN OF LEFT SHOULDER: ICD-10-CM

## 2017-04-03 PROCEDURE — 97110 THERAPEUTIC EXERCISES: CPT | Performed by: PHYSICAL THERAPIST

## 2017-04-03 PROCEDURE — 97530 THERAPEUTIC ACTIVITIES: CPT | Performed by: PHYSICAL THERAPIST

## 2017-04-03 PROCEDURE — 97140 MANUAL THERAPY 1/> REGIONS: CPT | Performed by: PHYSICAL THERAPIST

## 2017-04-03 NOTE — PROGRESS NOTES
Physical Therapy Daily Progress Note      Subjective Pt reports his shoulder has been doing good. Feels like it's getting stronger. Went to [place] this weekend. Has been moving some boxes.     Objective   See Exercise, Manual, and Modality Logs for complete treatment.       Assessment/Plan Cautioned pt about lifting boxes. He says he hasn't been doing anything too heavy.     Progress per Plan of Care           Manual Therapy:    15     mins  42324;  Therapeutic Exercise:    19    mins  33948;     Neuromuscular Shannan:        mins  21076;    Therapeutic Activity:     16     mins  05527;     Gait Training:           mins  33417;     Ultrasound:          mins  45460;    Electrical Stimulation:         mins  20490 ( );  Dry Needling          mins self-pay    Timed Treatment:   50   mins   Total Treatment:     64   mins    Kt Mcgrath, PT, DPT  Physical Therapist

## 2017-04-06 ENCOUNTER — TREATMENT (OUTPATIENT)
Dept: PHYSICAL THERAPY | Facility: CLINIC | Age: 62
End: 2017-04-06

## 2017-04-06 DIAGNOSIS — Z96.612 STATUS POST TOTAL SHOULDER REPLACEMENT, LEFT: Primary | ICD-10-CM

## 2017-04-06 DIAGNOSIS — M25.512 ACUTE PAIN OF LEFT SHOULDER: ICD-10-CM

## 2017-04-06 PROCEDURE — 97140 MANUAL THERAPY 1/> REGIONS: CPT | Performed by: PHYSICAL THERAPIST

## 2017-04-06 PROCEDURE — 97530 THERAPEUTIC ACTIVITIES: CPT | Performed by: PHYSICAL THERAPIST

## 2017-04-06 PROCEDURE — 97110 THERAPEUTIC EXERCISES: CPT | Performed by: PHYSICAL THERAPIST

## 2017-04-06 NOTE — PROGRESS NOTES
Physical Therapy Daily Progress Note      Subjective Pt reports he has been working the shoulder hard at home and is a little sore today.     Objective   See Exercise, Manual, and Modality Logs for complete treatment.       Assessment/Plan Strength left shoulder improving. More ease with active flexion ROM. Some subscap tightness with altered scapular arthrokinematics. Cautioned him again about over doing it at home.     Progress per Plan of Care           Manual Therapy:    15     mins  07282;  Therapeutic Exercise:    19     mins  09412;     Neuromuscular Shannan:        mins  77115;    Therapeutic Activity:     10     mins  90053;     Gait Training:           mins  66420;     Ultrasound:          mins  72153;    Electrical Stimulation:         mins  97237 ( );  Dry Needling          mins self-pay    Timed Treatment:   44   mins   Total Treatment:     58   mins    Kt Mcgrath, PT, DPT  Physical Therapist

## 2017-04-10 ENCOUNTER — TREATMENT (OUTPATIENT)
Dept: PHYSICAL THERAPY | Facility: CLINIC | Age: 62
End: 2017-04-10

## 2017-04-10 DIAGNOSIS — Z96.612 STATUS POST TOTAL SHOULDER REPLACEMENT, LEFT: Primary | ICD-10-CM

## 2017-04-10 DIAGNOSIS — M25.512 ACUTE PAIN OF LEFT SHOULDER: ICD-10-CM

## 2017-04-10 PROCEDURE — 97530 THERAPEUTIC ACTIVITIES: CPT | Performed by: PHYSICAL THERAPIST

## 2017-04-10 PROCEDURE — 97110 THERAPEUTIC EXERCISES: CPT | Performed by: PHYSICAL THERAPIST

## 2017-04-10 PROCEDURE — 97140 MANUAL THERAPY 1/> REGIONS: CPT | Performed by: PHYSICAL THERAPIST

## 2017-04-10 NOTE — PROGRESS NOTES
Physical Therapy Daily Progress Note/ MD note    Visit # : 18    Subjective Pt reports he is more comfortable sleeping now. Pain at worst 3-4/10. Is down to taking 1/2 pain pill at night to help him sleep. If he has pain it is at the incision and mainly at the end of the day or with certain movements. Some pain if he picks up something too heavy on accident.    Would like to be able to return to riding his bike and go canoeing.     Objective    See Exercise, Manual, and Modality Logs for complete treatment.     Left shoulder AROM (prior to treatment)  Flexion: 125  Abduction: 85  External Rotation at 45 degrees abd: 63  Internal Rotation at 45 degrees abd: 40  Extension: 48  Behind back to SI/sacrum    PROM  Flexion: 145   Abduction: 120  ER at 45:  50  IR at 45:  60    AROM post  degrees flexion    Assessment/Plan Pt has progressed with therapy. Shoulder ROM has improved. Strength progressing. Minimal pain. Tenderness has resolved. Pt has improved function with ADLs. Still some deficits with ROM and strength which would benefit from additional therapy.    STG  1. Met, ongoing  2.not met    LTG  1.not met  2. Not met  3. Not reported       Other Will continue therapy 2xweek unless advised otherwise.  Please feel free to contact me with any questions or concerns.                 Manual Therapy:    15     mins  49689;  Therapeutic Exercise:    19  mins  04877;     Neuromuscular Shannan:         mins  88235;    Therapeutic Activity:     15     mins  98191;     Gait Training:            mins  77385;     Ultrasound:          mins  63672;    Electrical Stimulation:         mins  08155 ( );  Dry Needling          mins self-pay    Timed Treatment:   49   mins   Total Treatment:     67  mins    Kt Mcgrath PT, DPT  Physical Therapist

## 2017-04-17 ENCOUNTER — TREATMENT (OUTPATIENT)
Dept: PHYSICAL THERAPY | Facility: CLINIC | Age: 62
End: 2017-04-17

## 2017-04-17 DIAGNOSIS — M25.512 ACUTE PAIN OF LEFT SHOULDER: ICD-10-CM

## 2017-04-17 DIAGNOSIS — Z96.612 STATUS POST TOTAL SHOULDER REPLACEMENT, LEFT: Primary | ICD-10-CM

## 2017-04-17 PROCEDURE — 97140 MANUAL THERAPY 1/> REGIONS: CPT | Performed by: PHYSICAL THERAPIST

## 2017-04-17 PROCEDURE — 97110 THERAPEUTIC EXERCISES: CPT | Performed by: PHYSICAL THERAPIST

## 2017-04-17 NOTE — PROGRESS NOTES
Physical Therapy Daily Progress Note    Visit # : 19    Subjective Pt reports shoulder has been feeling okay. Saw [facility] and was pleased with progress. Said to continue PT. No lifting more than 3lbs per pt report    Objective    See Exercise, Manual, and Modality Logs for complete treatment.   Improved AROM today. AROM in standing appeared equal to PROM      Assessment/Plan  Pt has 10 visits remaining with insurance. May need to decrease frequency of PT visits. Pt progressed with strengthening today and reported no increased pain. Will assess response next therapy session     Progress per Plan of Care           Manual Therapy:    10     mins  90912;  Therapeutic Exercise:    31     mins  12715;     Neuromuscular Shannan:         mins  94622;    Therapeutic Activity:          mins  51722;     Gait Training:            mins  61285;     Ultrasound:          mins  71208;    Electrical Stimulation:         mins  46785 ( );  Dry Needling          mins self-pay    Timed Treatment:   41   mins   Total Treatment:     54   mins    Kt Mcgrath, PT, DPT  Physical Therapist

## 2017-04-24 ENCOUNTER — TREATMENT (OUTPATIENT)
Dept: PHYSICAL THERAPY | Facility: CLINIC | Age: 62
End: 2017-04-24

## 2017-04-24 DIAGNOSIS — M25.512 ACUTE PAIN OF LEFT SHOULDER: ICD-10-CM

## 2017-04-24 DIAGNOSIS — Z96.612 STATUS POST TOTAL SHOULDER REPLACEMENT, LEFT: Primary | ICD-10-CM

## 2017-04-24 PROCEDURE — 97110 THERAPEUTIC EXERCISES: CPT | Performed by: PHYSICAL THERAPIST

## 2017-04-24 PROCEDURE — 97530 THERAPEUTIC ACTIVITIES: CPT | Performed by: PHYSICAL THERAPIST

## 2017-04-24 PROCEDURE — 97140 MANUAL THERAPY 1/> REGIONS: CPT | Performed by: PHYSICAL THERAPIST

## 2017-04-24 NOTE — PROGRESS NOTES
Physical Therapy Daily Progress Note    Visit # : 20    Subjective Pt reports shoulder continues to improve. Pain getting better. Improved function raising arm overhead    Objective    See Exercise, Manual, and Modality Logs for complete treatment.       Assessment/Plan  Shoulder ROM continues to improve. Still limited reaching arm behind back. Can reach behind head. To progress with strengthening    Progress per Plan of Care           Manual Therapy:    10     mins  17676;  Therapeutic Exercise:    18     mins  70893;     Neuromuscular Shannan:         mins  37117;    Therapeutic Activity:     12     mins  19129;     Gait Training:            mins  60992;     Ultrasound:          mins  32231;    Electrical Stimulation:         mins  24675 ( );  Dry Needling          mins self-pay    Timed Treatment:   40   mins   Total Treatment:     54   mins    Kt Mcgrath, PT, DPT   Physical Therapist

## 2017-04-27 ENCOUNTER — TREATMENT (OUTPATIENT)
Dept: PHYSICAL THERAPY | Facility: CLINIC | Age: 62
End: 2017-04-27

## 2017-04-27 DIAGNOSIS — M25.512 ACUTE PAIN OF LEFT SHOULDER: ICD-10-CM

## 2017-04-27 DIAGNOSIS — Z96.612 STATUS POST TOTAL SHOULDER REPLACEMENT, LEFT: Primary | ICD-10-CM

## 2017-04-27 PROCEDURE — 97110 THERAPEUTIC EXERCISES: CPT | Performed by: PHYSICAL THERAPIST

## 2017-04-27 PROCEDURE — 97140 MANUAL THERAPY 1/> REGIONS: CPT | Performed by: PHYSICAL THERAPIST

## 2017-04-27 NOTE — PROGRESS NOTES
Physical Therapy Daily Progress Note    Visit # : 21        Subjective Pt reports shoulder feels good. Better at night with sleeping. Washing hair has gotten easier and feels more normal.     Objective    See Exercise, Manual, and Modality Logs for complete treatment.       Assessment/Plan Pt continues to improve. Strength progressing. Still some tightness with subscapularis.     Progress per Plan of Care           Manual Therapy:    15     mins  88511;  Therapeutic Exercise:    29     mins  33799;     Neuromuscular Shannan:         mins  99744;    Therapeutic Activity:          mins  97640;     Gait Training:            mins  93942;     Ultrasound:          mins  43304;    Electrical Stimulation:         mins  50343 ( );  Dry Needling          mins self-pay    Timed Treatment:   44   mins   Total Treatment:     58   mins    Kt Mcgrath, PT, DPT  Physical Therapist

## 2017-05-01 ENCOUNTER — TREATMENT (OUTPATIENT)
Dept: PHYSICAL THERAPY | Facility: CLINIC | Age: 62
End: 2017-05-01

## 2017-05-01 DIAGNOSIS — Z96.612 STATUS POST TOTAL SHOULDER REPLACEMENT, LEFT: Primary | ICD-10-CM

## 2017-05-01 DIAGNOSIS — M25.512 ACUTE PAIN OF LEFT SHOULDER: ICD-10-CM

## 2017-05-01 PROCEDURE — 97140 MANUAL THERAPY 1/> REGIONS: CPT | Performed by: PHYSICAL THERAPIST

## 2017-05-01 PROCEDURE — 97530 THERAPEUTIC ACTIVITIES: CPT | Performed by: PHYSICAL THERAPIST

## 2017-05-01 PROCEDURE — 97110 THERAPEUTIC EXERCISES: CPT | Performed by: PHYSICAL THERAPIST

## 2017-05-04 ENCOUNTER — TREATMENT (OUTPATIENT)
Dept: PHYSICAL THERAPY | Facility: CLINIC | Age: 62
End: 2017-05-04

## 2017-05-04 DIAGNOSIS — M25.512 ACUTE PAIN OF LEFT SHOULDER: ICD-10-CM

## 2017-05-04 DIAGNOSIS — Z96.612 STATUS POST TOTAL SHOULDER REPLACEMENT, LEFT: Primary | ICD-10-CM

## 2017-05-04 PROCEDURE — 97140 MANUAL THERAPY 1/> REGIONS: CPT | Performed by: PHYSICAL THERAPIST

## 2017-05-04 PROCEDURE — 97110 THERAPEUTIC EXERCISES: CPT | Performed by: PHYSICAL THERAPIST

## 2017-05-08 ENCOUNTER — TREATMENT (OUTPATIENT)
Dept: PHYSICAL THERAPY | Facility: CLINIC | Age: 62
End: 2017-05-08

## 2017-05-08 DIAGNOSIS — Z96.612 STATUS POST TOTAL SHOULDER REPLACEMENT, LEFT: Primary | ICD-10-CM

## 2017-05-08 DIAGNOSIS — M25.512 ACUTE PAIN OF LEFT SHOULDER: ICD-10-CM

## 2017-05-08 PROCEDURE — 97140 MANUAL THERAPY 1/> REGIONS: CPT | Performed by: PHYSICAL THERAPIST

## 2017-05-08 PROCEDURE — 97110 THERAPEUTIC EXERCISES: CPT | Performed by: PHYSICAL THERAPIST

## 2017-05-08 PROCEDURE — 97530 THERAPEUTIC ACTIVITIES: CPT | Performed by: PHYSICAL THERAPIST

## 2017-05-15 ENCOUNTER — TREATMENT (OUTPATIENT)
Dept: PHYSICAL THERAPY | Facility: CLINIC | Age: 62
End: 2017-05-15

## 2017-05-15 DIAGNOSIS — Z96.612 STATUS POST TOTAL SHOULDER REPLACEMENT, LEFT: Primary | ICD-10-CM

## 2017-05-15 DIAGNOSIS — M25.512 ACUTE PAIN OF LEFT SHOULDER: ICD-10-CM

## 2017-05-15 PROCEDURE — 97140 MANUAL THERAPY 1/> REGIONS: CPT | Performed by: PHYSICAL THERAPIST

## 2017-05-15 PROCEDURE — 97110 THERAPEUTIC EXERCISES: CPT | Performed by: PHYSICAL THERAPIST

## 2017-05-15 PROCEDURE — 97530 THERAPEUTIC ACTIVITIES: CPT | Performed by: PHYSICAL THERAPIST

## 2017-05-30 ENCOUNTER — TREATMENT (OUTPATIENT)
Dept: PHYSICAL THERAPY | Facility: CLINIC | Age: 62
End: 2017-05-30

## 2017-05-30 DIAGNOSIS — Z96.612 STATUS POST TOTAL SHOULDER REPLACEMENT, LEFT: Primary | ICD-10-CM

## 2017-05-30 DIAGNOSIS — M25.512 ACUTE PAIN OF LEFT SHOULDER: ICD-10-CM

## 2017-05-30 PROCEDURE — 97530 THERAPEUTIC ACTIVITIES: CPT | Performed by: PHYSICAL THERAPIST

## 2017-05-30 PROCEDURE — 97110 THERAPEUTIC EXERCISES: CPT | Performed by: PHYSICAL THERAPIST

## 2017-06-08 ENCOUNTER — TREATMENT (OUTPATIENT)
Dept: PHYSICAL THERAPY | Facility: CLINIC | Age: 62
End: 2017-06-08

## 2017-06-08 DIAGNOSIS — M25.512 ACUTE PAIN OF LEFT SHOULDER: ICD-10-CM

## 2017-06-08 DIAGNOSIS — Z96.612 STATUS POST TOTAL SHOULDER REPLACEMENT, LEFT: Primary | ICD-10-CM

## 2017-06-08 PROCEDURE — 97140 MANUAL THERAPY 1/> REGIONS: CPT | Performed by: PHYSICAL THERAPIST

## 2017-06-08 PROCEDURE — 97110 THERAPEUTIC EXERCISES: CPT | Performed by: PHYSICAL THERAPIST

## 2017-06-08 PROCEDURE — 97530 THERAPEUTIC ACTIVITIES: CPT | Performed by: PHYSICAL THERAPIST

## 2017-06-08 NOTE — PROGRESS NOTES
Physical Therapy Daily Progress Note    Visit # : 27      Subjective Pt reports he has been doing strengthening exercises while absent from PT at his house in [place]. Shoulder has been feeling good. Thinks he is getting stronger. Still limited with ER with TB but has been able to do more reps    Objective    See Exercise, Manual, and Modality Logs for complete treatment.       Assessment/Plan Pt able to perform more resistance with shld flexion today with TB. Attempted to do red vs yellow with ER and pt could not maintain good form, so it was decided to stay with yellow. Will continue to see once a week.     Progress per Plan of Care           Manual Therapy:    15     mins  26329;  Therapeutic Exercise:    19     mins  91935;     Neuromuscular Shannan:         mins  58182;    Therapeutic Activity:     14     mins  19857;     Gait Training:            mins  49206;     Ultrasound:          mins  90913;    Electrical Stimulation:         mins  89111 ( );  Dry Needling          mins self-pay    Timed Treatment:   48   mins   Total Treatment:     50   mins    Kt Mcgrath, PT, DPT  Physical Therapist

## 2017-06-15 ENCOUNTER — TREATMENT (OUTPATIENT)
Dept: PHYSICAL THERAPY | Facility: CLINIC | Age: 62
End: 2017-06-15

## 2017-06-15 DIAGNOSIS — M25.512 ACUTE PAIN OF LEFT SHOULDER: ICD-10-CM

## 2017-06-15 DIAGNOSIS — Z96.612 STATUS POST TOTAL SHOULDER REPLACEMENT, LEFT: Primary | ICD-10-CM

## 2017-06-15 PROCEDURE — 97110 THERAPEUTIC EXERCISES: CPT | Performed by: PHYSICAL THERAPIST

## 2017-06-15 PROCEDURE — 97140 MANUAL THERAPY 1/> REGIONS: CPT | Performed by: PHYSICAL THERAPIST

## 2017-06-15 NOTE — PROGRESS NOTES
Physical Therapy Daily Progress Note    Visit # : 28      Subjective Pt reports shoulder feels good. Has been doing exercises at home. Can tell he's getting stronger.     Objective    See Exercise, Manual, and Modality Logs for complete treatment.       Assessment/Plan Pt nearing discharge. Issued blue tubing today. One additional visit for independence with HEP.     Progress per Plan of Care           Manual Therapy:    15     mins  02280;  Therapeutic Exercise:    24     mins  43686;     Neuromuscular Shannan:         mins  71763;    Therapeutic Activity:          mins  52510;     Gait Training:            mins  00234;     Ultrasound:          mins  19098;    Electrical Stimulation:         mins  07006 ( );  Dry Needling          mins self-pay    Timed Treatment:   39   mins   Total Treatment:     43   mins    Kt Mcgrath, PT, DPT  Physical Therapist

## 2017-06-22 ENCOUNTER — TREATMENT (OUTPATIENT)
Dept: PHYSICAL THERAPY | Facility: CLINIC | Age: 62
End: 2017-06-22

## 2017-06-22 DIAGNOSIS — M25.512 ACUTE PAIN OF LEFT SHOULDER: ICD-10-CM

## 2017-06-22 DIAGNOSIS — Z96.612 STATUS POST TOTAL SHOULDER REPLACEMENT, LEFT: Primary | ICD-10-CM

## 2017-06-22 PROCEDURE — 97140 MANUAL THERAPY 1/> REGIONS: CPT | Performed by: PHYSICAL THERAPIST

## 2017-06-22 PROCEDURE — 97110 THERAPEUTIC EXERCISES: CPT | Performed by: PHYSICAL THERAPIST

## 2017-06-22 NOTE — PROGRESS NOTES
Discharge Summary  Discharge Summary from Physical Therapy Report      Dates  PT visit: 2/6/17-6/22/17  Number of Visits: 29     Discharge Status of Patient: See MD Note dated 6/22/17    Goals: All Met    Discharge Plan: Continue with current home exercise program as instructed      Date of Discharge 6/22/17        Kt Mcgrath, PT, DPT  Physical Therapist

## 2017-06-22 NOTE — PROGRESS NOTES
Physical Therapy Daily Progress Note/MD note    Visit # : 29      Subjective Pt reports he has not been having any pain. Shoulder feels great. Returns to [place] in July and will be going out of town for 2 weeks. Today is last day with PT. Has been doing HEP    Objective    See Exercise, Manual, and Modality Logs for complete treatment.     AROM left shoulder  Flexion: 157  Extension: 47  ER at 45 abd: 74    MMT left shoulder  Flexion, IR, abduction: WNL  ER: 4/5    Assessment/Plan Feel pt is ready for discharge at this time to continue with exercises at home. Shoulder ROM and strength are much improved. Still some weakness with ER. Pain has resolved. He has been able to resume ADLs. Has been traveling frequently this summer and plans to be gone for 2 weeks. He will continue with exercises while out of town. He is very motivated with rehab of left shoulder and should do very well.     Thank you for this referral,  Kt Mcgrath PT, DPT           Manual Therapy:    12     mins  35462;  Therapeutic Exercise:    27     mins  19267;     Neuromuscular Shannan:         mins  98316;    Therapeutic Activity:          mins  22626;     Gait Training:            mins  44625;     Ultrasound:          mins  67928;    Electrical Stimulation:         mins  57818 ( );  Dry Needling          mins self-pay    Timed Treatment:   39   mins   Total Treatment:     52   mins    Kt Mcgrath PT ,DPT  Physical Therapist

## 2017-12-11 RX ORDER — FLUTICASONE PROPIONATE 50 MCG
SPRAY, SUSPENSION (ML) NASAL
Qty: 1 BOTTLE | Refills: 11 | Status: SHIPPED | OUTPATIENT
Start: 2017-12-11

## 2019-01-22 RX ORDER — FLUTICASONE PROPIONATE 50 MCG
SPRAY, SUSPENSION (ML) NASAL
Refills: 11 | OUTPATIENT
Start: 2019-01-22